# Patient Record
Sex: MALE | Race: WHITE | URBAN - METROPOLITAN AREA
[De-identification: names, ages, dates, MRNs, and addresses within clinical notes are randomized per-mention and may not be internally consistent; named-entity substitution may affect disease eponyms.]

---

## 2020-03-23 ENCOUNTER — OFFICE VISIT (OUTPATIENT)
Dept: NEUROLOGY | Age: 35
End: 2020-03-23

## 2020-03-23 VITALS
DIASTOLIC BLOOD PRESSURE: 94 MMHG | HEIGHT: 68 IN | RESPIRATION RATE: 20 BRPM | OXYGEN SATURATION: 99 % | BODY MASS INDEX: 25.37 KG/M2 | WEIGHT: 167.4 LBS | TEMPERATURE: 99 F | SYSTOLIC BLOOD PRESSURE: 154 MMHG | HEART RATE: 118 BPM

## 2020-03-23 DIAGNOSIS — R25.2 LIMB CRAMPS: Primary | ICD-10-CM

## 2020-03-23 RX ORDER — BISMUTH SUBSALICYLATE 262 MG
1 TABLET,CHEWABLE ORAL DAILY
COMMUNITY

## 2020-03-23 RX ORDER — TOPIRAMATE 25 MG/1
TABLET ORAL
COMMUNITY

## 2020-03-23 RX ORDER — AMITRIPTYLINE HYDROCHLORIDE 25 MG/1
50 TABLET, FILM COATED ORAL
COMMUNITY

## 2020-03-23 RX ORDER — METHADONE HYDROCHLORIDE 10 MG/ML
155 CONCENTRATE ORAL DAILY
COMMUNITY

## 2020-03-23 NOTE — PATIENT INSTRUCTIONS
Patient history reviewed patient examined. Propose an examination that includes testing that we will have to wait until after May 26 to get accomplished, in lieu of the coronavirus issues. This includes an EEG and a brain MRI. Will check labs that have been done by primary care and may want to add additional ones. Further suggestions could obviously follow.

## 2020-03-23 NOTE — PROGRESS NOTES
Neurology Consult      Subjective:      Danielle Bacon is a 29 y.o. male who comes in today with the following history. Says about 9 months ago had the onset of 3 separate episodes described as acute onset unanticipated limb jerking that would last for hours. No loss of consciousness and cognition is good. Afterwards feels physically exhausted. The last time this occurred was about 3 weeks ago. No clear cause-and-effect relations and no predilections except has occurred twice in sleep and once awake. It is enhanced by nothing or diminished by nothing. Denied any preceding new medications and sleep is otherwise okay. No tongue biting or loss of bowel or bladder continence. Testing has included blood work by primary care 3 weeks ago and we are in the process of obtaining that. Outside of these episodes he has no rate limiting neuromuscular performance issues. He does not smoke and alcohol is occasional is not been a problem. As I understand it he has had narcotic addiction and is currently on methadone. Medicines otherwise include Topamax and amitriptyline at night. Special sensory function as a goes to hearing smell taste and vision is good bulbar function intact and bowel and bladder function is good as well. Current Outpatient Medications   Medication Sig Dispense Refill    methadone (DOLOPHINE) 10 mg/mL solution Take 155 mg by mouth daily. Indications: dependence on opioid-type drugs      multivitamin (ONE A DAY) tablet Take 1 Tab by mouth daily.  amitriptyline (ELAVIL) 25 mg tablet Take 50 mg by mouth nightly.  topiramate (Topamax) 25 mg tablet Take  by mouth daily (with breakfast).  aspirin-acetaminophen-caffeine (EXCEDRIN ES) 250-250-65 mg per tablet Take 1 Tab by mouth.         Allergies   Allergen Reactions    Amoxicillin Rash     Past Medical History:   Diagnosis Date    Anxiety disorder     Essential hypertension     Headache 2018    migraines    Muscle pain Past Surgical History:   Procedure Laterality Date    HX HEENT      HX WISDOM TEETH EXTRACTION  2002    SC DEBRIDEMENT OPEN WOUND 20 SQ CM<  2015      Social History     Socioeconomic History    Marital status:      Spouse name: Not on file    Number of children: Not on file    Years of education: Not on file    Highest education level: Not on file   Occupational History    Not on file   Social Needs    Financial resource strain: Not on file    Food insecurity     Worry: Not on file     Inability: Not on file    Transportation needs     Medical: Not on file     Non-medical: Not on file   Tobacco Use    Smoking status: Former Smoker     Years: 15.00     Last attempt to quit: 2019     Years since quittin.2    Smokeless tobacco: Former User   Substance and Sexual Activity    Alcohol use:  Yes     Alcohol/week: 2.0 - 4.0 standard drinks     Types: 2 - 4 Cans of beer per week    Drug use: Not Currently     Types: Opiates     Comment: clean x 5yrs    Sexual activity: Not on file   Lifestyle    Physical activity     Days per week: Not on file     Minutes per session: Not on file    Stress: Not on file   Relationships    Social connections     Talks on phone: Not on file     Gets together: Not on file     Attends Zoroastrian service: Not on file     Active member of club or organization: Not on file     Attends meetings of clubs or organizations: Not on file     Relationship status: Not on file    Intimate partner violence     Fear of current or ex partner: Not on file     Emotionally abused: Not on file     Physically abused: Not on file     Forced sexual activity: Not on file   Other Topics Concern    Not on file   Social History Narrative    Not on file      Family History   Problem Relation Age of Onset    Cancer Mother     Other Mother         Autoimmune Hepatitis    MS Father     Neuropathy Father     MS Paternal Uncle     Neuropathy Paternal Uncle     Heart Disease Maternal Grandmother     Heart Disease Maternal Grandfather     Dementia Paternal Grandmother     Parkinsonism Paternal Grandmother       Visit Vitals  BP (!) 154/94   Pulse (!) 118   Temp 99 °F (37.2 °C) (Oral)   Resp 20   Ht 5' 8\" (1.727 m)   Wt 75.9 kg (167 lb 6.4 oz)   SpO2 99%   BMI 25.45 kg/m²        Review of Systems:   A comprehensive review of systems was negative except for that written in the HPI. Neuro Exam:     Appearance: The patient is well developed, well nourished, provides a coherent history and is in no acute distress. Mental Status: Oriented to time, place and person. Mood and affect appropriate. Cranial Nerves:   Intact visual fields. Fundi are benign. IWONA, EOM's full, no nystagmus, no ptosis. Facial sensation is normal. Corneal reflexes are intact. Facial movement is symmetric. Hearing is normal bilaterally. Palate is midline with normal sternocleidomastoid and trapezius muscles are normal. Tongue is midline. Motor:  5/5 strength in upper and lower proximal and distal muscles. Normal bulk and tone. No fasciculations. Reflexes:   Deep tendon reflexes 2+/4 and symmetrical.   Sensory:   Normal to touch, pinprick and vibration. Position sense intact. Gait:  Normal gait. Romberg negative   Tremor:   No tremor noted. Cerebellar:  No cerebellar signs present. Neurovascular:  Normal heart sounds and regular rhythm, peripheral pulses intact, and no carotid bruits. Toes downgoing no clonus no Albert's Lhermitte's negative             Assessment:   Paroxysmal limb dyskinesia. We will get an EEG and brain MRI done respecting the limitations of when to schedule. Need to get lab work from primary care as I may add to the screening process. Further suggestions may follow. Plan:   Revisit toward the beginning of June when testing hopefully will have been accomplished.   Signed by :  Hailee Lugo MD

## 2020-03-23 NOTE — LETTER
3/23/20 Patient: Rinku Moffett YOB: 1985 Date of Visit: 3/23/2020 Cassandra Conley MD 
[address] [id number] VIA Facsimile: [phone] Dear Cassandra Conley MD, Thank you for referring Mr. Rinku Moffett to [facility] for evaluation. My notes for this consultation are attached. If you have questions, please do not hesitate to call me. I look forward to following your patient along with you.  
 
 
Sincerely, 
 
Jocelyne Diaz MD

## 2020-03-24 NOTE — PROGRESS NOTES
After review of this case, details that are particular to this history, suggest with a review of the literature, the following possibility. There is an entity known as opiate related myoclonus and as such, other categories of medicine have been implicated as well, such as antibiotics antipsychotics antidepressants etc. common mechanisms of action among the drugs include potential downstream effects on dopaminergic serotonergic and gabatonergic and NMDA receptor(glutamate) pathways. Sometimes if possible, switching out the drug in favor of a different drug, a lower dose and sometimes a helper drug such as gabapentin or pregabalin might help.   CHINO NOEL.

## 2020-05-07 ENCOUNTER — OFFICE VISIT (OUTPATIENT)
Dept: NEUROLOGY | Age: 35
End: 2020-05-07

## 2020-05-07 VITALS — TEMPERATURE: 99 F

## 2020-05-07 DIAGNOSIS — R29.818 TRANSIENT NEUROLOGICAL SYMPTOMS: Primary | ICD-10-CM

## 2020-05-11 NOTE — PROGRESS NOTES
This was an elective routine EEG for evaluation of transient neurologic symptoms. Routine scalp leads were applied according to the 1020 International system. EKG monitor as well. The background rhythm was 8+ hertz. There was excellent modulation of background activity with eye opening and closure. No evidence of focal slowing or spontaneous electrocerebral discharges. EKG grossly sinus rhythm. No technician note as to any untoward patient movement mannerisms behavior or vocalizations. Hyperventilation not performed. Photic stim produced a reasonable photic drive at different harmonics. Impression: This is a normal awake routine EEG as described. Clinical correlation is advised.   CHINO NOEL.

## 2020-05-29 DIAGNOSIS — R25.2 LIMB CRAMPS: ICD-10-CM

## 2020-06-01 ENCOUNTER — HOSPITAL ENCOUNTER (OUTPATIENT)
Dept: MRI IMAGING | Age: 35
Discharge: HOME | End: 2020-06-01
Attending: SPECIALIST
Payer: COMMERCIAL

## 2020-06-01 DIAGNOSIS — R25.2 LIMB CRAMPS: ICD-10-CM

## 2020-06-01 PROCEDURE — 70551 MRI BRAIN STEM W/O DYE: CPT

## 2020-06-18 ENCOUNTER — OFFICE VISIT (OUTPATIENT)
Dept: NEUROLOGY | Age: 35
End: 2020-06-18

## 2020-06-18 VITALS
HEIGHT: 68 IN | WEIGHT: 170.5 LBS | RESPIRATION RATE: 18 BRPM | OXYGEN SATURATION: 99 % | SYSTOLIC BLOOD PRESSURE: 128 MMHG | DIASTOLIC BLOOD PRESSURE: 82 MMHG | BODY MASS INDEX: 25.84 KG/M2 | TEMPERATURE: 98.5 F | HEART RATE: 118 BPM

## 2020-06-18 DIAGNOSIS — G25.3 DRUG INDUCED MYOCLONUS: Primary | ICD-10-CM

## 2020-06-18 DIAGNOSIS — T50.905A DRUG INDUCED MYOCLONUS: Primary | ICD-10-CM

## 2020-06-18 NOTE — PROGRESS NOTES
Neurology Consult      Subjective:      Godfrey Milligan is a 29 y.o. male who comes in today with his spouse. Says the overall pattern of involuntary movements has decreased and become more localizable. Went over the literature search I did from the first visit, and apparently the consensus of opinion(opiate induced myoclonus), was it caused altered downstream neurochemical transmission, that precipitated involuntary movements as it went to dopamine serotonin gabapentin and NMDA circuits. This could also include helper drugs such as anti-depressants etc.. Suggestions tended to agree to either lower the drugs in place and/or switch out to a more user-friendly variety. Cheap advice would be to get good sleep minimize stress and exercise could help. One suggestion was the enlistment of gabapentin or pregabalin to suppress symptoms, but I am concerned with the implications as it goes to the methadone already in place and the drug interaction possibilities. Outpatient testing including brain MRI and EEG were normal.  Based on current encounter and history, cannot think of any additional testing I want to pursue at this time. Congratulations on the baby to come in several weeks and all the best.  Revisit as needed. Good luck         Current Outpatient Medications   Medication Sig Dispense Refill    methadone (DOLOPHINE) 10 mg/mL solution Take 155 mg by mouth daily. Indications: dependence on opioid-type drugs      multivitamin (ONE A DAY) tablet Take 1 Tab by mouth daily.  amitriptyline (ELAVIL) 25 mg tablet Take 50 mg by mouth nightly.  topiramate (Topamax) 25 mg tablet Take  by mouth daily (with breakfast).  aspirin-acetaminophen-caffeine (EXCEDRIN ES) 250-250-65 mg per tablet Take 1 Tab by mouth.         Allergies   Allergen Reactions    Amoxicillin Rash     Past Medical History:   Diagnosis Date    Anxiety disorder     Essential hypertension     Headache 2018    migraines    Muscle pain Past Surgical History:   Procedure Laterality Date    HX HEENT      HX WISDOM TEETH EXTRACTION  2002    MA DEBRIDEMENT OPEN WOUND 20 SQ CM<  2015      Social History     Socioeconomic History    Marital status:      Spouse name: Not on file    Number of children: Not on file    Years of education: Not on file    Highest education level: Not on file   Occupational History    Not on file   Social Needs    Financial resource strain: Not on file    Food insecurity     Worry: Not on file     Inability: Not on file    Transportation needs     Medical: Not on file     Non-medical: Not on file   Tobacco Use    Smoking status: Former Smoker     Years: 15.00     Last attempt to quit: 2019     Years since quittin.4    Smokeless tobacco: Former User   Substance and Sexual Activity    Alcohol use:  Yes     Alcohol/week: 2.0 - 4.0 standard drinks     Types: 2 - 4 Cans of beer per week    Drug use: Not Currently     Types: Opiates     Comment: clean x 5yrs    Sexual activity: Not on file   Lifestyle    Physical activity     Days per week: Not on file     Minutes per session: Not on file    Stress: Not on file   Relationships    Social connections     Talks on phone: Not on file     Gets together: Not on file     Attends Oriental orthodox service: Not on file     Active member of club or organization: Not on file     Attends meetings of clubs or organizations: Not on file     Relationship status: Not on file    Intimate partner violence     Fear of current or ex partner: Not on file     Emotionally abused: Not on file     Physically abused: Not on file     Forced sexual activity: Not on file   Other Topics Concern    Not on file   Social History Narrative    Not on file      Family History   Problem Relation Age of Onset    Cancer Mother     Other Mother         Autoimmune Hepatitis    MS Father     Neuropathy Father     MS Paternal Uncle     Neuropathy Paternal Uncle     Heart Disease Maternal Grandmother     Heart Disease Maternal Grandfather     Dementia Paternal Grandmother     Parkinsonism Paternal Grandmother       Visit Vitals  /82   Pulse (!) 118   Temp 98.5 °F (36.9 °C)   Resp 18   Ht 5' 8\" (1.727 m)   Wt 77.3 kg (170 lb 8 oz)   SpO2 99%   BMI 25.92 kg/m²        Review of Systems:   A comprehensive review of systems was negative except for that written in the HPI. Neuro Exam:     Appearance: The patient is well developed, well nourished, provides a coherent history and is in no acute distress. Mental Status: Oriented to time, place and person. Mood and affect appropriate. Cranial Nerves:   Intact visual fields. Fundi are benign. IWONA, EOM's full, no nystagmus, no ptosis. Facial sensation is normal. Corneal reflexes are intact. Facial movement is symmetric. Hearing is normal bilaterally. Palate is midline with normal sternocleidomastoid and trapezius muscles are normal. Tongue is midline. Motor:  5/5 strength in upper and lower proximal and distal muscles. Normal bulk and tone. No fasciculations. Reflexes:   Deep tendon reflexes 2+/4 and symmetrical.   Sensory:   Normal to touch, pinprick and vibration. Gait:  Normal gait. Tremor:   No tremor noted. Cerebellar:  No cerebellar signs present. Neurovascular:  Normal heart sounds and regular rhythm, peripheral pulses intact, and no carotid bruits. Assessment:   Involuntary self-limited stereotypical involuntary movements. 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  Normal brain MRI and EEG were reassuring. Simple advice would be good good sleep minimize stress and regular exercise could help the cause. Congratulations on the baby to come in all the best.  Welcome back as needed. Plan:   Revisit as needed.   Signed by :  Niranjan Brumfield MD

## 2020-06-18 NOTE — LETTER
6/18/20 Patient: Sudarshna Medley YOB: 1985 Date of Visit: 6/18/2020 Juancarlos Sneed MD 
[address] [id number] VIA Facsimile: [phone] Dear Juancarlos Sneed MD, Thank you for referring Mr. Sudarshan Medley to [facility] for evaluation. My notes for this consultation are attached. If you have questions, please do not hesitate to call me. I look forward to following your patient along with you.  
 
 
Sincerely, 
 
Angela Kennedy MD

## 2020-06-18 NOTE — PATIENT INSTRUCTIONS
Went over with patient and spouse the test results which were normal as a goes to both the imaging of the brain and the EEG. I get the idea of the spells are less dramatic and more self-contained. My review of the literature supports the possible notion of a opiate-induced myoclonus type activity. This basically goes to altered chemical signaling in the nerve networks, and causes spontaneous involuntary and self-limited movements. They tend to collectively talk about reduction of the dose of medicines in place including narcotics and/or antidepressants and similar medicines of design. The other possibility is replacement of the existent drugs for perhaps a more user-friendly version. Bottom line is to help the cause get good sleep minimize stress and may be a regular physical activity such as exercise could help. Congratulations on the baby to come and all the best.  Welcome back as needed. Good luck.

## 2023-05-21 RX ORDER — ACETAMINOPHEN, ASPIRIN AND CAFFEINE 250; 250; 65 MG/1; MG/1; MG/1
1 TABLET, FILM COATED ORAL
COMMUNITY

## 2023-05-21 RX ORDER — AMITRIPTYLINE HYDROCHLORIDE 25 MG/1
50 TABLET, FILM COATED ORAL NIGHTLY
COMMUNITY

## 2023-05-21 RX ORDER — METHADONE HYDROCHLORIDE 10 MG/ML
155 CONCENTRATE ORAL DAILY
COMMUNITY

## 2023-05-21 RX ORDER — TOPIRAMATE 25 MG/1
TABLET ORAL
COMMUNITY